# Patient Record
Sex: FEMALE | Race: WHITE
[De-identification: names, ages, dates, MRNs, and addresses within clinical notes are randomized per-mention and may not be internally consistent; named-entity substitution may affect disease eponyms.]

---

## 2022-07-17 ENCOUNTER — HOSPITAL ENCOUNTER (EMERGENCY)
Dept: HOSPITAL 79 - ER1 | Age: 32
LOS: 1 days | Discharge: HOME | End: 2022-07-18
Payer: COMMERCIAL
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
## 2022-07-17 DIAGNOSIS — S16.1XXA: Primary | ICD-10-CM

## 2022-07-17 DIAGNOSIS — I10: ICD-10-CM

## 2022-07-17 DIAGNOSIS — F17.210: ICD-10-CM

## 2022-08-03 ENCOUNTER — HOSPITAL ENCOUNTER (OUTPATIENT)
Dept: HOSPITAL 79 - KOH-I | Age: 32
End: 2022-08-03
Attending: STUDENT IN AN ORGANIZED HEALTH CARE EDUCATION/TRAINING PROGRAM
Payer: COMMERCIAL

## 2022-08-03 DIAGNOSIS — K59.00: Primary | ICD-10-CM

## 2022-08-04 ENCOUNTER — HOSPITAL ENCOUNTER (EMERGENCY)
Dept: HOSPITAL 79 - ER1 | Age: 32
Discharge: HOME | End: 2022-08-04
Payer: COMMERCIAL

## 2022-08-04 DIAGNOSIS — R53.83: ICD-10-CM

## 2022-08-04 DIAGNOSIS — F17.200: ICD-10-CM

## 2022-08-04 DIAGNOSIS — R00.2: Primary | ICD-10-CM

## 2022-08-04 DIAGNOSIS — Z86.16: ICD-10-CM

## 2022-08-04 DIAGNOSIS — I48.91: ICD-10-CM

## 2022-08-04 LAB
BUN/CREATININE RATIO: 13 (ref 0–10)
HGB BLD-MCNC: 14.3 GM/DL (ref 12.3–15.3)
RED BLOOD COUNT: 5.05 M/UL (ref 4–5.1)
WHITE BLOOD COUNT: 11.3 K/UL (ref 4.5–11)

## 2023-08-24 ENCOUNTER — HOSPITAL ENCOUNTER (OUTPATIENT)
Dept: GENERAL RADIOLOGY | Facility: HOSPITAL | Age: 33
Discharge: HOME | End: 2023-08-24
Admitting: FAMILY MEDICINE
Payer: MEDICAID

## 2023-08-24 ENCOUNTER — OFFICE VISIT (OUTPATIENT)
Dept: ORTHOPEDIC SURGERY | Facility: CLINIC | Age: 33
End: 2023-08-24
Payer: MEDICAID

## 2023-08-24 VITALS
OXYGEN SATURATION: 99 % | HEIGHT: 63 IN | SYSTOLIC BLOOD PRESSURE: 124 MMHG | WEIGHT: 221 LBS | DIASTOLIC BLOOD PRESSURE: 87 MMHG | BODY MASS INDEX: 39.16 KG/M2 | HEART RATE: 76 BPM

## 2023-08-24 DIAGNOSIS — G89.29 CHRONIC MIDLINE LOW BACK PAIN WITH BILATERAL SCIATICA: ICD-10-CM

## 2023-08-24 DIAGNOSIS — M54.42 CHRONIC MIDLINE LOW BACK PAIN WITH BILATERAL SCIATICA: ICD-10-CM

## 2023-08-24 DIAGNOSIS — R29.2 DIMINISHED REFLEXES ON EXAMINATION: ICD-10-CM

## 2023-08-24 DIAGNOSIS — M53.3 SACROILIAC PAIN: Primary | ICD-10-CM

## 2023-08-24 DIAGNOSIS — M46.1 SACROILIITIS: ICD-10-CM

## 2023-08-24 DIAGNOSIS — M25.50 POLYARTHRALGIA: ICD-10-CM

## 2023-08-24 DIAGNOSIS — M54.41 CHRONIC MIDLINE LOW BACK PAIN WITH BILATERAL SCIATICA: ICD-10-CM

## 2023-08-24 DIAGNOSIS — M79.10 MUSCLE PAIN: ICD-10-CM

## 2023-08-24 DIAGNOSIS — M62.838 MUSCLE SPASM: ICD-10-CM

## 2023-08-24 DIAGNOSIS — M53.3 SACROILIAC PAIN: ICD-10-CM

## 2023-08-24 PROCEDURE — 72202 X-RAY EXAM SI JOINTS 3/> VWS: CPT

## 2023-08-24 RX ORDER — METHOCARBAMOL 500 MG/1
500 TABLET, FILM COATED ORAL EVERY 8 HOURS PRN
Qty: 90 TABLET | Refills: 1 | Status: SHIPPED | OUTPATIENT
Start: 2023-08-24

## 2023-08-24 RX ORDER — ERGOCALCIFEROL 1.25 MG/1
1 CAPSULE ORAL WEEKLY
COMMUNITY
Start: 2023-08-14

## 2023-08-28 NOTE — PROGRESS NOTES
New Patient Visit      Patient: Roopa Wilson  YOB: 1990  Date of Encounter: 2023  PCP: Alex Caal DO  Referring Provider: Alex Caal DO     Subjective   Roopa Wilson is a 33 y.o. female who presents to the office today for evaluation of Pain and Initial Evaluation of the Lumbar Spine      Chief Complaint   Patient presents with    Lumbar Spine - Pain, Initial Evaluation       HPI    Patient presents complaining of chronic low back pain worse on the right low back and posterior hip.  States that it feels like she needs to pop it.  Has been going on for a long time without injury.  Is been PT in the past which helps but is temporary.  Sometimes will radiate down the right leg.  Sometimes both legs go to sleep when she is sitting for too long.  Ibuprofen and stretching helps.  Patient notes that she has a lot of pain in other joints and that her hands swell sometimes  There is no problem list on file for this patient.      Past Medical History:   Diagnosis Date    Heart attack     Heart murmur     Paroxysmal A-fib     Tachycardia        Past Surgical History:   Procedure Laterality Date    CYST REMOVAL Right     CYST REMOVED FROM R WRIST BETWEEN BONES AFTER FRACTURE    KNEE SURGERY Left     TONSILLECTOMY AND ADENOIDECTOMY      TUBAL ABDOMINAL LIGATION         Family History   Problem Relation Age of Onset    Osteoporosis Mother     Cancer Mother     Heart attack Father     Scoliosis Brother     Heart failure Maternal Aunt     Cancer Paternal Uncle     Osteoporosis Maternal Grandmother     Cancer Maternal Grandmother     Cancer Paternal Grandmother        Social History     Socioeconomic History    Marital status:    Tobacco Use    Smoking status: Former     Types: Cigarettes     Quit date: 2022     Years since quittin.7    Smokeless tobacco: Never   Vaping Use    Vaping Use: Every day   Substance and Sexual Activity    Alcohol use: Never    Drug use: Never     "Sexual activity: Defer       Current Outpatient Medications   Medication Sig Dispense Refill    vitamin D (ERGOCALCIFEROL) 1.25 MG (65599 UT) capsule capsule Take 1 capsule by mouth 1 (One) Time Per Week.      diclofenac (VOLTAREN) 50 MG EC tablet Take 1 tablet by mouth 2 (Two) Times a Day As Needed (PAIN). 60 tablet 1    methocarbamol (ROBAXIN) 500 MG tablet Take 1 tablet by mouth Every 8 (Eight) Hours As Needed for Muscle Spasms. 90 tablet 1     No current facility-administered medications for this visit.       Allergies   Allergen Reactions    Fluoxetine Hives    Metoprolol Tartrate Unknown - High Severity    Paroxetine Hcl Unknown - High Severity       Review of Systems   Constitutional:  Positive for activity change. Negative for fever.   Respiratory:  Negative for shortness of breath and wheezing.    Cardiovascular:  Negative for chest pain.   Musculoskeletal:  Positive for arthralgias, back pain and myalgias.   Skin:  Negative for color change and wound.   Neurological:  Negative for weakness and numbness.     Visit Vitals  /87 (BP Location: Right arm, Patient Position: Sitting, Cuff Size: Adult)   Pulse 76   Ht 160 cm (63\")   Wt 100 kg (221 lb)   SpO2 99%   BMI 39.15 kg/mý     33 y.o.female  Physical Exam  Vitals and nursing note reviewed.   Constitutional:       General: She is not in acute distress.     Appearance: Normal appearance.   Pulmonary:      Effort: Pulmonary effort is normal. No respiratory distress.   Musculoskeletal:      Lumbar back: Spasms, tenderness and bony tenderness present. Decreased range of motion. Positive right straight leg raise test. Negative left straight leg raise test.      Comments: Tender right SI and gluteal musculature.  Mild positive right straight leg raise   Skin:     General: Skin is warm and dry.      Findings: No erythema.   Neurological:      General: No focal deficit present.      Mental Status: She is alert.      Sensory: No sensory deficit.      Motor: No " weakness.       Radiology Results:    XR Sacroiliac Joints 3+ View    Result Date: 8/24/2023    No acute findings in the sacrum or coccyx.  This report was finalized on 8/24/2023 12:50 PM by Dr. Liam Lopez MD.        Assessment & Plan   Diagnoses and all orders for this visit:    1. Sacroiliac pain (Primary)  -     XR Sacroiliac Joints 3+ View; Future  -     XR Spine Lumbar Complete 4+VW; Future  -     diclofenac (VOLTAREN) 50 MG EC tablet; Take 1 tablet by mouth 2 (Two) Times a Day As Needed (PAIN).  Dispense: 60 tablet; Refill: 1  -     methocarbamol (ROBAXIN) 500 MG tablet; Take 1 tablet by mouth Every 8 (Eight) Hours As Needed for Muscle Spasms.  Dispense: 90 tablet; Refill: 1  -     Ambulatory Referral to Physical Therapy Evaluate and treat    2. Muscle pain  -     XR Spine Lumbar Complete 4+VW; Future  -     diclofenac (VOLTAREN) 50 MG EC tablet; Take 1 tablet by mouth 2 (Two) Times a Day As Needed (PAIN).  Dispense: 60 tablet; Refill: 1  -     methocarbamol (ROBAXIN) 500 MG tablet; Take 1 tablet by mouth Every 8 (Eight) Hours As Needed for Muscle Spasms.  Dispense: 90 tablet; Refill: 1  -     Ambulatory Referral to Physical Therapy Evaluate and treat    3. Muscle spasm  -     XR Spine Lumbar Complete 4+VW; Future  -     diclofenac (VOLTAREN) 50 MG EC tablet; Take 1 tablet by mouth 2 (Two) Times a Day As Needed (PAIN).  Dispense: 60 tablet; Refill: 1  -     methocarbamol (ROBAXIN) 500 MG tablet; Take 1 tablet by mouth Every 8 (Eight) Hours As Needed for Muscle Spasms.  Dispense: 90 tablet; Refill: 1  -     Ambulatory Referral to Physical Therapy Evaluate and treat    4. Sacroiliitis  -     XR Spine Lumbar Complete 4+VW; Future  -     diclofenac (VOLTAREN) 50 MG EC tablet; Take 1 tablet by mouth 2 (Two) Times a Day As Needed (PAIN).  Dispense: 60 tablet; Refill: 1  -     methocarbamol (ROBAXIN) 500 MG tablet; Take 1 tablet by mouth Every 8 (Eight) Hours As Needed for Muscle Spasms.  Dispense: 90 tablet;  Refill: 1  -     Ambulatory Referral to Physical Therapy Evaluate and treat    5. Chronic midline low back pain with bilateral sciatica  -     XR Spine Lumbar Complete 4+VW; Future  -     diclofenac (VOLTAREN) 50 MG EC tablet; Take 1 tablet by mouth 2 (Two) Times a Day As Needed (PAIN).  Dispense: 60 tablet; Refill: 1  -     methocarbamol (ROBAXIN) 500 MG tablet; Take 1 tablet by mouth Every 8 (Eight) Hours As Needed for Muscle Spasms.  Dispense: 90 tablet; Refill: 1  -     Ambulatory Referral to Physical Therapy Evaluate and treat    6. Diminished reflexes on examination  -     XR Spine Lumbar Complete 4+VW; Future  -     diclofenac (VOLTAREN) 50 MG EC tablet; Take 1 tablet by mouth 2 (Two) Times a Day As Needed (PAIN).  Dispense: 60 tablet; Refill: 1  -     methocarbamol (ROBAXIN) 500 MG tablet; Take 1 tablet by mouth Every 8 (Eight) Hours As Needed for Muscle Spasms.  Dispense: 90 tablet; Refill: 1  -     Ambulatory Referral to Physical Therapy Evaluate and treat    7. Polyarthralgia  -     XR Spine Lumbar Complete 4+VW; Future  -     diclofenac (VOLTAREN) 50 MG EC tablet; Take 1 tablet by mouth 2 (Two) Times a Day As Needed (PAIN).  Dispense: 60 tablet; Refill: 1  -     methocarbamol (ROBAXIN) 500 MG tablet; Take 1 tablet by mouth Every 8 (Eight) Hours As Needed for Muscle Spasms.  Dispense: 90 tablet; Refill: 1  -     Ambulatory Referral to Physical Therapy Evaluate and treat         MEDS ORDERED DURING VISIT:  New Medications Ordered This Visit   Medications    diclofenac (VOLTAREN) 50 MG EC tablet     Sig: Take 1 tablet by mouth 2 (Two) Times a Day As Needed (PAIN).     Dispense:  60 tablet     Refill:  1    methocarbamol (ROBAXIN) 500 MG tablet     Sig: Take 1 tablet by mouth Every 8 (Eight) Hours As Needed for Muscle Spasms.     Dispense:  90 tablet     Refill:  1     MEDICATION ISSUES:  Discussed medication options and treatment plan of prescribed medication as well as the risks, benefits, and side effects  including potential falls, possible impaired driving and metabolic adversities among others. Patient is agreeable to call the office with any worsening of symptoms or onset of side effects. Patient is agreeable to call 911 or go to the nearest ER should he/she begin having SI/HI.     Discussion:  Recommended trial of different NSAID and muscle relaxer to try to help with symptoms.  Recommend patient return for physical therapy.  Strongly consider OMT or MRIs if not improving.  Follow-up in 4 to 6 weeks             This document has been electronically signed by George Mcdonald DO   August 27, 2023 23:14 EDT    Part of this note may be an electronic transcription/translation of spoken language to printed text using the Dragon Dictation System.